# Patient Record
Sex: MALE | Race: WHITE | NOT HISPANIC OR LATINO | Employment: FULL TIME | RURAL
[De-identification: names, ages, dates, MRNs, and addresses within clinical notes are randomized per-mention and may not be internally consistent; named-entity substitution may affect disease eponyms.]

---

## 2018-10-10 ENCOUNTER — OFFICE VISIT (OUTPATIENT)
Dept: OTOLARYNGOLOGY | Facility: CLINIC | Age: 68
End: 2018-10-10

## 2018-10-10 ENCOUNTER — CLINICAL SUPPORT (OUTPATIENT)
Dept: AUDIOLOGY | Facility: CLINIC | Age: 68
End: 2018-10-10

## 2018-10-10 VITALS — BODY MASS INDEX: 22.28 KG/M2 | HEIGHT: 68 IN | TEMPERATURE: 96.8 F | WEIGHT: 147 LBS

## 2018-10-10 DIAGNOSIS — IMO0001 ASYMMETRICAL HEARING LOSS OF LEFT EAR: Primary | ICD-10-CM

## 2018-10-10 DIAGNOSIS — H90.3 SENSORINEURAL HEARING LOSS, ASYMMETRICAL: Primary | ICD-10-CM

## 2018-10-10 DIAGNOSIS — H93.13 TINNITUS AURIUM, BILATERAL: ICD-10-CM

## 2018-10-10 PROCEDURE — 99203 OFFICE O/P NEW LOW 30 MIN: CPT | Performed by: OTOLARYNGOLOGY

## 2018-10-10 NOTE — PROGRESS NOTES
Subjective   Armin Mcbride is a 67 y.o. male.   Ear issues  History of Present Illness     Comes in with a history of hearing loss and asymmetric hearing [facility] some [facility] in and his hearing loss has had head trauma in the past as well he finds hearing aids that is been given be helpful his hearing is worse in the left than the right he has bilateral tinnitus denies any ear infections he has no known implants other than hip no other mental and no pacemaker.  Is not having vertigo    The following portions of the patient's history were reviewed and updated as appropriate: allergies, current medications, past family history, past medical history, past social history, past surgical history and problem list.      Armin Mcbride reports that he quit smoking about 10 years ago. His smoking use included Cigarettes. He smoked 1.00 pack per day. He has never used smokeless tobacco. He reports that he drinks alcohol.  Patient is not a tobacco user and has been counseled for use of tobacco products    Family History   Problem Relation Age of Onset   • Cancer Mother    • Heart disease Father        No current outpatient prescriptions on file.    No Known Allergies    History reviewed. No pertinent past medical history.      Review of Systems   Constitutional: Positive for appetite change.   HENT: Positive for hearing loss.    Respiratory: Positive for shortness of breath.    Endocrine: Positive for cold intolerance.        Hair loss   Genitourinary:        Nighttime Urination   Musculoskeletal:        Arthritis   All other systems reviewed and are negative.          Objective   Physical Exam   Constitutional: He is oriented to person, place, and time. He appears well-developed and well-nourished.   HENT:   Head: Normocephalic and atraumatic.   Right Ear: Tympanic membrane, external ear and ear canal normal.   Left Ear: Tympanic membrane, external ear and ear canal normal.   Nose: Nose normal. No  mucosal edema, rhinorrhea, nasal deformity or septal deviation. No epistaxis. Right sinus exhibits no maxillary sinus tenderness and no frontal sinus tenderness. Left sinus exhibits no maxillary sinus tenderness and no frontal sinus tenderness.   Mouth/Throat: Uvula is midline, oropharynx is clear and moist and mucous membranes are normal. No trismus in the jaw. Normal dentition. No oropharyngeal exudate or posterior oropharyngeal edema.   Eyes: Conjunctivae are normal.   Neck: Normal range of motion. Neck supple. No JVD present. No tracheal deviation present. No thyromegaly present.   Pulmonary/Chest: Effort normal.   Musculoskeletal: Normal range of motion.   Lymphadenopathy:        Head (right side): No submental, no submandibular, no tonsillar, no preauricular, no posterior auricular and no occipital adenopathy present.        Head (left side): No submental, no submandibular, no tonsillar, no preauricular, no posterior auricular and no occipital adenopathy present.     He has no cervical adenopathy.        Right cervical: No superficial cervical, no deep cervical and no posterior cervical adenopathy present.       Left cervical: No superficial cervical, no deep cervical and no posterior cervical adenopathy present.   Neurological: He is alert and oriented to person, place, and time. No cranial nerve deficit.   Skin: Skin is warm.   Psychiatric: He has a normal mood and affect. His speech is normal and behavior is normal. Thought content normal.   Nursing note and vitals reviewed.      AudioGram and actual tracings were shown to the patient [facility] as [facility] test here showing hypertrophy hearing loss with decreased speech discrimination left worse than right      Assessment/Plan   Armin was seen today for hearing loss.    Diagnoses and all orders for this visit:    Asymmetrical hearing loss of left ear  -     Creatinine, Serum; Future  -     MRI Internal Auditory Canal With Contrast;  Future    Tinnitus aurium, bilateral        Because they asymmetry the patient would like to go with MRI we discussed possible pathophysiology's.  Hopefully this will be normal not we'll refer him to appropriate neurologist and neurosurgeon her neuro-otologist he is going to continue with his hearing aids and avoid loud noise he's agreement this will see him as needed I [facility]   copies to be sent to the audiologist Lora Hernandez    Will follow up after the MRI is done

## 2018-10-10 NOTE — PATIENT INSTRUCTIONS

## 2018-10-11 NOTE — PROGRESS NOTES
STANDARD AUDIOMETRIC EVALUATION      Name:  Armin Mcbride  :  1950  Age:  67 y.o.  Date of Evaluation:  10/10/2018    HISTORY    Reason for visit:  Armin Mcbride is seen today for a hearing evaluation at the request of [facility] Authorization #[id number]/ Dr. Trung Suazo.  Patient reports that he has difficulty hearing with the left being worse than the right.  He reports occasional tinnitus.  He reports a history of noise exposure.  He reports that he wears bilateral hearing aids, which he got last week.      EVALUATION    See Audiogram    RESULTS        Otoscopy and Tympanometry 226 Hz :  Right Ear:  Otoscopy:  Clear ear canal          Tympanometry:  Middle ear function within normal limits    Left Ear:   Otoscopy:  Clear ear canal        Tympanometry:  Middle ear function within normal limits    Test technique:  Standard Audiometry     Pure Tone Audiometry:   Patient responded to pure tones at 15-75 dB for 250-8000 Hz in right ear, and at 20-80 dB for 250-8000 Hz in left ear.       Speech Audiometry:        Right Ear:  Speech Reception Threshold (SRT) was obtained at 20 dBHL                 Speech Discrimination scores were 64% in quiet when words were presented at 60 dBHL       Left Ear:  Speech Reception Threshold (SRT) was obtained at 30 dBHL                 Speech Discrimination scores were 60% in quiet when words were presented at 70 dBHL    Reliability:   good    IMPRESSIONS:  1.  Tympanometry results are consistent with Middle ear function within normal limits in both ears.  2.  Pure tone results are consistent with within normal limits to severe sloping sensorineural hearing loss for both ears.     RECOMMENDATIONS:  Patient is seeing the Ear Nose and Throat physician immediately following this examination.  It was a pleasure seeing Armin Mcbride in Audiology today.  We would be happy to do further testing or discuss these test as necessary.      This document has been electronically signed by Vidhi  BEATRIZ Quevedo, MAIKEL on October 11, 2018 7:49 AM          MAIKEL Deng  Licensed Audiologist

## 2018-10-17 DIAGNOSIS — H91.8X9 ASYMMETRICAL HEARING LOSS, UNSPECIFIED LATERALITY: Primary | ICD-10-CM

## 2018-10-19 ENCOUNTER — TELEPHONE (OUTPATIENT)
Dept: OTOLARYNGOLOGY | Facility: CLINIC | Age: 68
End: 2018-10-19

## 2018-10-19 NOTE — TELEPHONE ENCOUNTER
Per Dr. Suazo patient was notified by phone that his MRI was normal.  Patient stated that he would not be able to keep his appointment on 10/31/2018 and this was rescheduled for 11/15/2018 in [place].

## 2018-11-07 DIAGNOSIS — H91.8X9 ASYMMETRICAL HEARING LOSS, UNSPECIFIED LATERALITY: ICD-10-CM

## 2018-11-14 ENCOUNTER — OFFICE VISIT (OUTPATIENT)
Dept: OTOLARYNGOLOGY | Facility: CLINIC | Age: 68
End: 2018-11-14

## 2018-11-14 VITALS — TEMPERATURE: 96.6 F | BODY MASS INDEX: 21.98 KG/M2 | WEIGHT: 145 LBS | HEIGHT: 68 IN

## 2018-11-14 DIAGNOSIS — H93.13 TINNITUS AURIUM, BILATERAL: ICD-10-CM

## 2018-11-14 DIAGNOSIS — H91.8X9 ASYMMETRICAL HEARING LOSS, UNSPECIFIED LATERALITY: Primary | ICD-10-CM

## 2018-11-14 DIAGNOSIS — H60.331 CHRONIC SWIMMER'S EAR OF RIGHT SIDE: ICD-10-CM

## 2018-11-14 PROCEDURE — 99213 OFFICE O/P EST LOW 20 MIN: CPT | Performed by: OTOLARYNGOLOGY

## 2018-11-14 RX ORDER — CLOBETASOL PROPIONATE 0.5 MG/ML
LOTION TOPICAL EVERY 12 HOURS SCHEDULED
Qty: 118 ML | Refills: 1 | Status: SHIPPED | OUTPATIENT
Start: 2018-11-14 | End: 2020-02-11

## 2018-11-14 NOTE — PATIENT INSTRUCTIONS

## 2018-11-14 NOTE — PROGRESS NOTES
Subjective   Armin Mcbride is a 68 y.o. male.   Patient comes back in follow-up of his hearing loss and tinnitus and asymmetric hearing    History of Present Illness   She notes no changes hearing is had his audiologist adjust his hearing aid levels.  He does have itching his ears at times related to the hearing aid through bothersome though he has no drainage or pain      The following portions of the patient's history were reviewed and updated as appropriate: allergies, current medications, past family history, past medical history, past social history, past surgical history and problem list.      Current Outpatient Medications:   •  clobetasol (CLOBEX) 0.05 % lotion, Apply  topically to the appropriate area as directed Every 12 (Twelve) Hours. Use for 4 days only at a time, Disp: 118 mL, Rfl: 1    No Known Allergies          Review of Systems   Constitutional: Negative for fever.   HENT: Positive for hearing loss and tinnitus. Negative for ear discharge and ear pain.         Ears itching   Neurological: Positive for dizziness.   Hematological: Negative for adenopathy.           Objective   Physical Exam   Constitutional: He is oriented to person, place, and time. He appears well-developed and well-nourished.   HENT:   Head: Normocephalic and atraumatic.   Right Ear: Tympanic membrane, external ear and ear canal normal.   Left Ear: Tympanic membrane, external ear and ear canal normal.   Nose: Nose normal. No mucosal edema, rhinorrhea, nasal deformity or septal deviation. No epistaxis. Right sinus exhibits no maxillary sinus tenderness and no frontal sinus tenderness. Left sinus exhibits no maxillary sinus tenderness and no frontal sinus tenderness.   Mouth/Throat: Uvula is midline, oropharynx is clear and moist and mucous membranes are normal. No trismus in the jaw. Normal dentition. No oropharyngeal exudate or posterior oropharyngeal edema.   Eyes: Conjunctivae are normal.   Neck: Normal range of motion. Neck  supple. No JVD present. No tracheal deviation present. No thyromegaly present.   Pulmonary/Chest: Effort normal.   Musculoskeletal: Normal range of motion.   Lymphadenopathy:        Head (right side): No submental, no submandibular, no tonsillar, no preauricular, no posterior auricular and no occipital adenopathy present.        Head (left side): No submental, no submandibular, no tonsillar, no preauricular, no posterior auricular and no occipital adenopathy present.     He has no cervical adenopathy.        Right cervical: No superficial cervical, no deep cervical and no posterior cervical adenopathy present.       Left cervical: No superficial cervical, no deep cervical and no posterior cervical adenopathy present.   Neurological: He is alert and oriented to person, place, and time. No cranial nerve deficit.   Skin: Skin is warm.   Psychiatric: He has a normal mood and affect. His speech is normal and behavior is normal. Thought content normal.   Nursing note and vitals reviewed.    MRI revealed normal brain and IACs      Assessment/Plan   Armin was seen today for follow-up.    Diagnoses and all orders for this visit:    Asymmetrical hearing loss, unspecified laterality    Tinnitus aurium, bilateral    Chronic swimmer's ear of right side    Other orders  -     clobetasol (CLOBEX) 0.05 % lotion; Apply  topically to the appropriate area as directed Every 12 (Twelve) Hours. Use for 4 days only at a time    Planed in about using the drops and call us if not improving    I reviewed his MRI explained in the there is no evidence of tumor mass that should use hearing protection and continuous hearing aids in continued follow-up of [facility] was reviewed with the patient and normal brain and IACs    Strategies to minimize tinnitus he's agreement with his current approach

## 2019-11-20 ENCOUNTER — OFFICE VISIT (OUTPATIENT)
Dept: OTOLARYNGOLOGY | Facility: CLINIC | Age: 69
End: 2019-11-20

## 2019-11-20 ENCOUNTER — CLINICAL SUPPORT (OUTPATIENT)
Dept: AUDIOLOGY | Facility: CLINIC | Age: 69
End: 2019-11-20

## 2019-11-20 VITALS — BODY MASS INDEX: 20.16 KG/M2 | TEMPERATURE: 96.8 F | HEIGHT: 68 IN | WEIGHT: 133 LBS

## 2019-11-20 DIAGNOSIS — IMO0001 BILATERAL ASYMMETRIC SENSORINEURAL HEARING LOSS: Primary | ICD-10-CM

## 2019-11-20 DIAGNOSIS — R13.12 OROPHARYNGEAL DYSPHAGIA: ICD-10-CM

## 2019-11-20 DIAGNOSIS — H91.8X9 ASYMMETRICAL HEARING LOSS, UNSPECIFIED LATERALITY: Primary | ICD-10-CM

## 2019-11-20 PROCEDURE — 31575 DIAGNOSTIC LARYNGOSCOPY: CPT | Performed by: OTOLARYNGOLOGY

## 2019-11-20 PROCEDURE — 99213 OFFICE O/P EST LOW 20 MIN: CPT | Performed by: OTOLARYNGOLOGY

## 2019-11-20 PROCEDURE — 92557 COMPREHENSIVE HEARING TEST: CPT | Performed by: AUDIOLOGIST

## 2019-11-20 RX ORDER — ATORVASTATIN CALCIUM 10 MG/1
10 TABLET, FILM COATED ORAL DAILY
COMMUNITY
Start: 2019-09-23

## 2019-11-20 NOTE — PROGRESS NOTES
Subjective   Armin Mcbride is a 69 y.o. male.   Follow-up ear problems    History of Present Illness   Patient states he does not have any ear drainage or ear pain is not sure if his hearing is changed tinnitus is not been bothersome he separately says the food gets caught in his throat at times he points to the middle of his throat just at the level of the larynx    No sore throat no neck masses though he has lost some weight he is not having aspiration and denies reflux symptoms  The following portions of the patient's history were reviewed and updated as appropriate: allergies, current medications, past family history, past medical history, past social history, past surgical history and problem list.      Current Outpatient Medications:   •  atorvastatin (LIPITOR) 10 MG tablet, Take 10 mg by mouth Daily., Disp: , Rfl:   •  clobetasol (CLOBEX) 0.05 % lotion, Apply  topically to the appropriate area as directed Every 12 (Twelve) Hours. Use for 4 days only at a time, Disp: 118 mL, Rfl: 1    No Known Allergies          Review of Systems   Constitutional: Negative for fever.   HENT: Positive for hearing loss and trouble swallowing. Negative for ear discharge, ear pain, sore throat and voice change.    Hematological: Negative for adenopathy.           Objective   Physical Exam   Constitutional: He appears well-developed.   HENT:   Head: Normocephalic.   Right Ear: External ear normal.   Nose: Nose normal.   Mouth/Throat: Uvula is midline, oropharynx is clear and moist and mucous membranes are normal.   Eyes: Conjunctivae are normal.   Neck: Neck supple.   Pulmonary/Chest: Effort normal.   Musculoskeletal: Normal range of motion.   Neurological: He is alert.   Skin: Skin is warm.   Psychiatric: He has a normal mood and affect.   Nursing note and vitals reviewed.    AudioGram was reviewed with the patient actual tracing showing the asymmetry in the sensorineural hearing loss    Procedure Note    Pre-operative Diagnosis:    Chief Complaint   Patient presents with   • Follow-up       Post-operative Diagnosis: same    Anesthesia: topical with xylocaine and neosynephrine    Endoscopy Type:  Flexible Laryngoscopy    Procedure Details:    The patient was placed in the sitting position.  After topical anesthesia and decongestion, the 4 mm laryngoscope was passed.  The nasal cavities, nasopharynx, oropharynx, hypopharynx, and larynx were all examined.  Vocal cords were examined during respiration and phonation.  The following findings were noted:    Findings: No obvious lesion or mass seen minimal erythema    Condition:  Stable.  Patient tolerated procedure well.    Complications:  None  Assessment/Plan   Armin was seen today for follow-up.    Diagnoses and all orders for this visit:    Asymmetrical hearing loss, unspecified laterality    Oropharyngeal dysphagia    Arrange for barium swallow to look for evidence of diverticulum we will hold off medical treatments pending the results the barium swallow will call with results  As the hearing is to use hearing protection around loud noise    Will recheck in 1 year he is Matt had his hearing aids adjusted

## 2019-11-20 NOTE — PATIENT INSTRUCTIONS

## 2019-11-20 NOTE — PROGRESS NOTES
STANDARD AUDIOMETRIC EVALUATION      Name:  Armin Mcbride  :  1950  Age:  69 y.o.  Date of Evaluation:  2019      HISTORY    Reason for visit:  Armin Mcbride was seen today for a hearing evaluation at the request of Dr. Trung Suazo.   He has a history of asymmetric sensorineural hearing loss that is worse in the left ear. Mr. Mcbride is unsure if his hearing sensitivity has changed. He reported the presence of tinnitus has improved since his last hearing test. Mr. Mcbride denied aural pressure and vertigo. He currently wears hearing aids that he receive through the [facility]. Mr. Mcbride reported receiving good benefit from amplification. No other significant audiologic information was reported.      EVALUATION    See Audiogram    RESULTS        Otoscopy and Tympanometry 226 Hz :  Right Ear:  Otoscopy:  Visible ear drum          Tympanometry:  Did not test    Left Ear:   Otoscopy:  Visible ear drum        Tympanometry:  Did not test    Test technique:  Standard Audiometry     Pure Tone Audiometry:   Patient responded to pure tones at 25-80 dB for 250-8000 Hz in right ear, and at 30-80 dB for 250-8000 Hz in left ear.       Speech Audiometry:        Right Ear:  Speech Reception Threshold (SRT) was obtained at 25 dBHL                 Speech Discrimination scores were 56% in quiet when words were presented at 70 dBHL       Left Ear:  Speech Reception Threshold (SRT) was obtained at 35 dBHL                 Speech Discrimination scores were 36% in quiet when words were presented at 75 dBHL    Reliability:   good    IMPRESSIONS:  1.  Tympanometry testing was not completed as it was not deemed medically necessary.  2.  Pure tone results are consistent with a mild sloping to severe sensorineural hearing loss bilaterally. Asymmetries were noted at 2000 and 8000 Hz with the left ear being the poorer ear. A decrease in hearing sensitivity and word recognition was noted when compared to previous test  results.      RECOMMENDATIONS:  Patient is seeing the Ear Nose and Throat physician immediately following this examination.  It was a pleasure seeing Armin Mcbride in Audiology today.  We would be happy to do further testing or discuss these test as necessary.              This document has been electronically signed by MAIKEL Forde on November 20, 2019 10:24 AM

## 2019-12-10 ENCOUNTER — TELEPHONE (OUTPATIENT)
Dept: OTOLARYNGOLOGY | Facility: CLINIC | Age: 69
End: 2019-12-10

## 2019-12-10 DIAGNOSIS — K44.9 HIATAL HERNIA: ICD-10-CM

## 2019-12-10 DIAGNOSIS — K22.4 ESOPHAGEAL DYSMOTILITY: Primary | ICD-10-CM

## 2019-12-10 NOTE — TELEPHONE ENCOUNTER
Called patient to let him know that  wants him to see a gastro doctor because of his esophagram results. Patient was also informed that  would like to see him back in May for a follow up with an audio for his hearing loss.

## 2020-02-11 ENCOUNTER — HOSPITAL ENCOUNTER (OUTPATIENT)
Facility: HOSPITAL | Age: 70
Setting detail: HOSPITAL OUTPATIENT SURGERY
End: 2020-02-11
Attending: INTERNAL MEDICINE | Admitting: INTERNAL MEDICINE

## 2020-02-11 ENCOUNTER — OFFICE VISIT (OUTPATIENT)
Dept: GASTROENTEROLOGY | Facility: CLINIC | Age: 70
End: 2020-02-11

## 2020-02-11 VITALS
HEART RATE: 51 BPM | HEIGHT: 68 IN | WEIGHT: 137.8 LBS | BODY MASS INDEX: 20.88 KG/M2 | SYSTOLIC BLOOD PRESSURE: 120 MMHG | DIASTOLIC BLOOD PRESSURE: 70 MMHG

## 2020-02-11 DIAGNOSIS — R09.89 GLOBUS SENSATION: ICD-10-CM

## 2020-02-11 DIAGNOSIS — K21.9 GASTROESOPHAGEAL REFLUX DISEASE, ESOPHAGITIS PRESENCE NOT SPECIFIED: Primary | ICD-10-CM

## 2020-02-11 DIAGNOSIS — K22.4 ESOPHAGEAL DYSMOTILITY: ICD-10-CM

## 2020-02-11 DIAGNOSIS — R13.10 DYSPHAGIA, UNSPECIFIED TYPE: ICD-10-CM

## 2020-02-11 PROCEDURE — 99203 OFFICE O/P NEW LOW 30 MIN: CPT | Performed by: PHYSICIAN ASSISTANT

## 2020-02-11 RX ORDER — DEXTROSE AND SODIUM CHLORIDE 5; .45 G/100ML; G/100ML
30 INJECTION, SOLUTION INTRAVENOUS CONTINUOUS PRN
Status: CANCELLED | OUTPATIENT
Start: 2020-02-11

## 2020-02-11 NOTE — PATIENT INSTRUCTIONS

## 2020-02-11 NOTE — PROGRESS NOTES
Chief Complaint   Patient presents with   • Difficulty Swallowing     Ref. Dr. Gabriele HARRISON PROCEDURE ORDERED: EGDw/poss dilation dysphagia, gerd    Subjective    Armin Mcbride is a 69 y.o. male. he is being seen for consultation today at the request of Dr. Suazo.    History of Present Illness    This 69-year-old  was sent for consultation for dysphagia by Dr. Suazo who saw the patient on 2019. The patient underwent an uneventful laryngoscopy. He was having difficulty with food catching, states it feels like it holds at the base of his neck. It does not occur with every swallow. Usually liquids will go down without difficulty. Some times he is coughing up food. He denies any chest pain or substernal discomfort. No nausea or vomiting. He denied heartburn. Bowel movements have been regular. No blood or mucus in his stool. He does not think he has ever had an EGD. He believes he had a colonoscopy done in [place] perhaps 2 years ago. He had gained up to 150 pounds when he quit smoking some time ago, but he is generally at his regular weight.    The patient currently denies tobacco, alcohol, illicit substance use. He was previous pack a day smoker for 39 years. He has had a broken hip with surgical fix, appendectomy, tonsillectomy, broken left shoulder, right hand and right foot. He is not certain of any other operations. Family history of any heart disease and cancer. Father  at age 71 with heart disease. Mother  of unknown cancer at age 79. He was  for 6 years previously, 1 brother in good health, 2 children in good health, 1 sister .    ASSESSMENT/PLAN: Patient with dysphagia, suspect possible peptic stricture, would consider motility disorder. I discussed the possibility of a Zenker's diverticulum. Initially had recommended a barium study. The patient states that he had already had one at [facility] and we were able to get that, dated 2019 by   Gabriele. The patient had a cine film of the esophagus at [facility]. She had a small hiatal hernia with moderate dysmotility. No demonstrated reflux. No aspiration, etc. We therefore recommend EGD with possible dilatation. We will do biopsy as indicated. We could consider eosinophilic esophagitis and will do biopsies. We will plan follow up after the above, further pending clinical course and the results of the above.    Thank you very much, Dr. Suazo, for this consultation and for allowing to participate in the care of your patient. We will keep you informed.      The following portions of the patient's history were reviewed and updated as appropriate:   Past Medical History:   Diagnosis Date   • Heart trouble      Past Surgical History:   Procedure Laterality Date   • APPENDECTOMY     • COLONOSCOPY     • INTERTROCHANTERIC HIP FRACTURE SURGERY     • TONSILLECTOMY       Family History   Problem Relation Age of Onset   • Cancer Mother    • Breast cancer Mother    • Uterine cancer Mother    • Heart disease Father        No Known Allergies  Social History     Socioeconomic History   • Marital status: Single     Spouse name: Not on file   • Number of children: Not on file   • Years of education: Not on file   • Highest education level: Not on file   Tobacco Use   • Smoking status: Former Smoker     Packs/day: 1.00     Types: Cigarettes     Last attempt to quit: 10/10/2008     Years since quittin.3   • Smokeless tobacco: Never Used   Substance and Sexual Activity   • Alcohol use: Yes   • Drug use: Never     Current Medications:  Prior to Admission medications    Medication Sig Start Date End Date Taking? Authorizing Provider   atorvastatin (LIPITOR) 10 MG tablet Take 10 mg by mouth Daily. 19  Yes Provider, MD Gilbert   clobetasol (CLOBEX) 0.05 % lotion Apply  topically to the appropriate area as directed Every 12 (Twelve) Hours. Use for 4 days only at a time 18  Trung Suazo,  "MD     Review of Systems  Review of Systems   Constitutional: Positive for unexpected weight change.   HENT: Positive for trouble swallowing.    Respiratory: Negative for choking.    Gastrointestinal: Negative for abdominal distention, abdominal pain, anal bleeding, blood in stool, constipation, diarrhea, nausea, rectal pain and vomiting.   All other systems reviewed and are negative.         Objective    /70 (BP Location: Left arm)   Pulse 51   Ht 172.7 cm (68\")   Wt 62.5 kg (137 lb 12.8 oz)   BMI 20.95 kg/m²   Physical Exam   Constitutional: He is oriented to person, place, and time. He appears well-developed and well-nourished. No distress.   HENT:   Head: Normocephalic and atraumatic.   Eyes: Pupils are equal, round, and reactive to light. EOM are normal.   Neck: Normal range of motion.   Cardiovascular: Normal rate, regular rhythm and normal heart sounds.   Pulmonary/Chest: Effort normal and breath sounds normal.   Abdominal: Soft. Bowel sounds are normal. He exhibits no shifting dullness, no distension, no abdominal bruit, no ascites and no mass. There is no hepatosplenomegaly. There is tenderness. There is no rigidity, no rebound, no guarding and no CVA tenderness. No hernia. Hernia confirmed negative in the ventral area.   mild   Musculoskeletal: Normal range of motion.   Neurological: He is alert and oriented to person, place, and time.   Skin: Skin is warm and dry.   Psychiatric: He has a normal mood and affect. His behavior is normal. Judgment and thought content normal.   Nursing note and vitals reviewed.    Assessment/Plan      1. Gastroesophageal reflux disease, esophagitis presence not specified    2. Dysphagia, unspecified type    3. Globus sensation    4. Esophageal dysmotility    .   Armin was seen today for difficulty swallowing.    Diagnoses and all orders for this visit:    Gastroesophageal reflux disease, esophagitis presence not specified  -     Case Request; Standing  -     Case " Request    Dysphagia, unspecified type  -     Case Request; Standing  -     Case Request    Globus sensation  -     Case Request; Standing  -     Case Request    Esophageal dysmotility  -     Case Request; Standing  -     Case Request    Other orders  -     Follow Anesthesia Guidelines / Standing Orders; Future  -     Obtain Informed Consent; Future        Orders placed during this encounter include:  Orders Placed This Encounter   Procedures   • Follow Anesthesia Guidelines / Standing Orders     Standing Status:   Future   • Obtain Informed Consent     Standing Status:   Future     Order Specific Question:   Informed Consent Given For     Answer:   ESOPHAGOGASTRODUODENOSCOPY WITH DILATATION       Medications prescribed:  No orders of the defined types were placed in this encounter.    Discontinued Medications       Reason for Discontinue     clobetasol (CLOBEX) 0.05 % lotion    *Therapy completed        Requested Prescriptions      No prescriptions requested or ordered in this encounter       Review and/or summary of lab tests, radiology, procedures, medications. Review and summary of old records and obtaining of history. The risks and benefits of my recommendations, as well as other treatment options were discussed with the patient today. Questions were answered.    Follow-up: Return if symptoms worsen or fail to improve, for After the above.     ESOPHAGOGASTRODUODENOSCOPY WITH DILATATION (N/A)      This document has been electronically signed by Gokul Lozoya PA-C on February 17, 2020 6:47 PM      No results found for this or any previous visit.    Some portions of this note have been dictated using voice recognition software and may contain errors and/or omissions.